# Patient Record
Sex: FEMALE | Race: WHITE | NOT HISPANIC OR LATINO | URBAN - METROPOLITAN AREA
[De-identification: names, ages, dates, MRNs, and addresses within clinical notes are randomized per-mention and may not be internally consistent; named-entity substitution may affect disease eponyms.]

---

## 2019-01-01 ENCOUNTER — EMERGENCY (EMERGENCY)
Facility: HOSPITAL | Age: 84
LOS: 0 days | End: 2019-07-17
Attending: EMERGENCY MEDICINE
Payer: COMMERCIAL

## 2019-01-01 DIAGNOSIS — R55 SYNCOPE AND COLLAPSE: ICD-10-CM

## 2019-01-01 DIAGNOSIS — I46.9 CARDIAC ARREST, CAUSE UNSPECIFIED: ICD-10-CM

## 2019-01-01 DIAGNOSIS — R11.10 VOMITING, UNSPECIFIED: ICD-10-CM

## 2019-01-01 LAB — GLUCOSE BLDC GLUCOMTR-MCNC: 269 MG/DL — HIGH (ref 70–99)

## 2019-01-01 PROCEDURE — 99285 EMERGENCY DEPT VISIT HI MDM: CPT

## 2019-07-17 NOTE — ED PROVIDER NOTE - OBJECTIVE STATEMENT
89 year old female was brought to the ED in cardiac arrest. She walking in front of her house when her  says she fell or passed out, hitting her head on the ground and started to vomit. She was unresponsive. EMS arrived, found the pt pulseless and not breathing. Intubated the pt and performed CPR, epi given x 2 without return of pulse. EMS estimates that they have been resuscitating the patient for approximately 20 minutes prior to arrival.

## 2019-07-17 NOTE — ED ADULT TRIAGE NOTE - CHIEF COMPLAINT QUOTE
BIBA in cardiac arresst. as per ems pt fell, hit her head vomited and then became unresponsive. arrest witnessed by . 2 epi given by ems. pt recieved intubated. IO to right leg.

## 2019-07-17 NOTE — ED ADULT NURSE NOTE - NSSUHOSCREENINGYN_ED_ALL_ED
Patient called and missed phone call please call back when you can [phone] No - the patient is unable to be screened due to medical condition

## 2019-07-17 NOTE — ED ADULT NURSE NOTE - OBJECTIVE STATEMENT
pt BIBA in cardiac arrest. per ems pt fell, witnessed hit her head vomited and then became unresponsive. arrest witnessed by . 2 epi given by ems. pt received intubated. IO to right leg.

## 2019-07-17 NOTE — ED ADULT NURSE REASSESSMENT NOTE - NS ED NURSE REASSESS COMMENT FT1
pt present in the ED unresponsive.  Epi x5 given IVP, BIcarb x1 given. calcium x1 given. No ROSC.  pt intubated in the field. ET tube 7.5 lip line. PT observed with large amount of fluid per mouth. suction provided. Asystole. pt pronounced at 1704.